# Patient Record
Sex: FEMALE | Race: WHITE | Employment: OTHER | URBAN - METROPOLITAN AREA
[De-identification: names, ages, dates, MRNs, and addresses within clinical notes are randomized per-mention and may not be internally consistent; named-entity substitution may affect disease eponyms.]

---

## 2017-06-21 ENCOUNTER — HOSPITAL ENCOUNTER (OUTPATIENT)
Dept: SLEEP MEDICINE | Age: 72
Discharge: HOME | End: 2017-06-21
Attending: INTERNAL MEDICINE
Payer: MEDICARE

## 2017-06-21 DIAGNOSIS — N18.5 CHRONIC KIDNEY DISEASE, STAGE V (HCC): ICD-10-CM

## 2017-06-21 DIAGNOSIS — R79.81 ABNORMAL ARTERIAL BLOOD GASES: ICD-10-CM

## 2017-06-21 DIAGNOSIS — G62.9 NEUROPATHY: ICD-10-CM

## 2017-06-21 DIAGNOSIS — R91.1 PULMONARY NODULE: ICD-10-CM

## 2017-06-21 DIAGNOSIS — R06.00 DYSPNEA: ICD-10-CM

## 2017-06-21 DIAGNOSIS — I50.22 CHRONIC SYSTOLIC HEART FAILURE (HCC): ICD-10-CM

## 2017-06-21 DIAGNOSIS — G47.33 OBSTRUCTIVE SLEEP APNEA (ADULT) (PEDIATRIC): ICD-10-CM

## 2017-06-21 DIAGNOSIS — I10 UNSPECIFIED ESSENTIAL HYPERTENSION: ICD-10-CM

## 2017-06-21 DIAGNOSIS — R06.3 CHEYNE-STOKES RESPIRATION: ICD-10-CM

## 2017-06-21 DIAGNOSIS — E66.2 OBESITY HYPOVENTILATION SYNDROME (HCC): ICD-10-CM

## 2017-06-21 DIAGNOSIS — E11.00 DM HYPEROSMOLARITY TYPE II (HCC): ICD-10-CM

## 2017-06-21 DIAGNOSIS — F03.90 DEMENTIA (HCC): ICD-10-CM

## 2017-06-21 DIAGNOSIS — G47.33 OSA (OBSTRUCTIVE SLEEP APNEA): ICD-10-CM

## 2017-06-21 PROCEDURE — 95810 POLYSOM 6/> YRS 4/> PARAM: CPT

## 2017-06-22 VITALS — HEART RATE: 80 BPM | SYSTOLIC BLOOD PRESSURE: 137 MMHG | DIASTOLIC BLOOD PRESSURE: 85 MMHG

## 2017-06-23 NOTE — PROCEDURES
[facility]  POLYSOMNOGRAM    Name:  Lc Sharpe  MR#:  [id number]  :  1945  Account #:  [de-identified]  Date of Adm:  2017  Date of Service:  2017      REFERRING PHYSICIAN:  Janneth Foster M.D. READING PHYSICIAN:  Nabeel Burroughs. Michael Hou M.D. HISTORY  The patient reports being on:  1. MSIR. 2. Neurontin. 3. Macrobid. 4. Prilosec. 5. Aricept. 6. Lasix. 7. Potassium chloride. 8. Senokot. 9. Lisinopril. 10. Miralax. 11. Multivitamin. 12. Calcium. 13. Cymbalta. 14. Lantus. 15. Zocor. 16. Humalog. Blood pressure prior to the study was 147/84 and post-study 137/85. The study was obtained due to suspected sleep apnea. She reported  an Selmer Sleepiness Score of 5. STOP-Bang score was 4 out of 8. Occasional PVCs were noted on the EKG monitoring. The patient  reported taking Restoril prior to the study. She reported 10 hours of  sleep the night prior to the study. She admitted to caffeine use. She  did not specify the amount. She denied naps and alcohol use. DESCRIPTION OF PROCEDURE AND FINDINGS:  The study was  obtained with the standard parameter monitoring. Total recording time  was 379 minutes and total sleep time was 376, for 100% sleep  efficiency. Sleep latency was 0.5 minutes. Wake time after sleep  onset was 5.5 minutes. Arousal index was normal at 5. On review of  sleep architecture, the patient entered all stages of sleep. REM  latency was 105. On respiratory review, the patient had 25 hypopneas,  for an overall apnea-hypopnea index of 4. In REM, she had an apnea-  hypopnea index of 8. Her overall RDI was 4.3. In REM, it was 9. Apnea-hypopnea index in supine sleep was 4. Snoring was minimally  noted supine. She did not sleep in other positions. Mean oxygen  saturation was 93% in non-REM and 92% in REM. Minimum was 80%  in non-REM and 77% in REM. She spent 51.5 minutes below 88% O2  saturation.   Mean heart rate was 73 in non-REM and 75 in REM. Minimum was 64 in non-REM and 68 in REM. Maximum was 80 in  non-REM and 81 in REM. She had a periodic movements of sleep  index of 0.50, associated with arousal.    CONCLUSION:  The patient's polysomnographic study was remarkable  for mild primarily REM obstructive sleep apnea, but with significant  oxygen desaturation down to 77%. Would consider the institution of  continuous positive airway pressure therapy as clinically warranted. The patient should not drive if drowsy.         Maureen Fiore MD    NM / [address]  D:  06/22/2017   14:30  T:  06/22/2017   19:46  Job #:  [id number]

## 2019-11-08 PROBLEM — R78.81 BACTEREMIA DUE TO GRAM-NEGATIVE BACTERIA: Status: ACTIVE | Noted: 2019-10-31

## 2019-11-08 PROBLEM — I27.20 PULMONARY HYPERTENSION (HCC): Status: ACTIVE | Noted: 2017-05-15

## 2019-11-08 PROBLEM — R60.0 EDEMA, LOWER EXTREMITY: Status: ACTIVE | Noted: 2018-09-28

## 2019-11-08 PROBLEM — R74.8 ALKALINE PHOSPHATASE ELEVATION: Status: ACTIVE | Noted: 2019-10-31

## 2019-11-08 PROBLEM — N39.0 UTI DUE TO EXTENDED-SPECTRUM BETA LACTAMASE (ESBL) PRODUCING ESCHERICHIA COLI: Status: ACTIVE | Noted: 2018-12-26

## 2019-11-08 PROBLEM — H35.342 MACULAR CYST, HOLE, OR PSEUDOHOLE, LEFT EYE: Status: ACTIVE | Noted: 2019-11-08

## 2019-11-08 PROBLEM — E87.5 HYPERKALEMIA: Status: ACTIVE | Noted: 2017-05-12

## 2019-11-08 PROBLEM — I10 HYPERTENSION: Status: ACTIVE | Noted: 2017-05-12

## 2019-11-08 PROBLEM — B96.29 UTI DUE TO EXTENDED-SPECTRUM BETA LACTAMASE (ESBL) PRODUCING ESCHERICHIA COLI: Status: ACTIVE | Noted: 2018-12-26

## 2019-11-08 PROBLEM — H43.811 VITREOUS DEGENERATION OF RIGHT EYE: Status: ACTIVE | Noted: 2019-11-08

## 2019-11-08 PROBLEM — G93.41 ACUTE METABOLIC ENCEPHALOPATHY: Status: ACTIVE | Noted: 2018-12-26

## 2019-11-08 PROBLEM — Z98.84 HX OF GASTRIC BYPASS: Status: ACTIVE | Noted: 2017-05-12

## 2019-11-08 PROBLEM — N18.9 CHRONIC KIDNEY DISEASE: Status: ACTIVE | Noted: 2017-05-11

## 2019-11-08 PROBLEM — N30.00 ACUTE CYSTITIS WITHOUT HEMATURIA: Status: ACTIVE | Noted: 2019-09-15

## 2019-11-08 PROBLEM — I73.9 PERIPHERAL VASCULAR DISEASE (HCC): Status: ACTIVE | Noted: 2018-09-28

## 2019-11-08 PROBLEM — G47.33 OBSTRUCTIVE SLEEP APNEA: Status: ACTIVE | Noted: 2017-05-15

## 2019-11-08 PROBLEM — N39.0 URINARY TRACT INFECTION, SITE NOT SPECIFIED: Status: ACTIVE | Noted: 2019-09-15

## 2019-11-08 PROBLEM — K83.8 DILATED CBD, ACQUIRED: Status: ACTIVE | Noted: 2019-10-31

## 2019-11-08 PROBLEM — H33.322: Status: ACTIVE | Noted: 2019-11-08

## 2019-11-08 PROBLEM — S82.102A CLOSED FRACTURE OF LEFT PROXIMAL TIBIA: Status: ACTIVE | Noted: 2019-04-05

## 2019-11-08 PROBLEM — E11.9 CONTROLLED TYPE 2 DIABETES MELLITUS WITHOUT COMPLICATION (HCC): Status: ACTIVE | Noted: 2018-09-28

## 2019-11-08 PROBLEM — Z16.12 UTI DUE TO EXTENDED-SPECTRUM BETA LACTAMASE (ESBL) PRODUCING ESCHERICHIA COLI: Status: ACTIVE | Noted: 2018-12-26

## 2019-11-08 PROBLEM — H35.372 PUCKERING OF MACULA, LEFT EYE: Status: ACTIVE | Noted: 2019-11-08

## 2019-11-08 PROBLEM — L97.422 SKIN ULCER OF LEFT HEEL WITH FAT LAYER EXPOSED (HCC): Status: ACTIVE | Noted: 2018-09-28

## 2019-11-08 PROBLEM — R41.0 DELIRIUM: Status: ACTIVE | Noted: 2019-11-08

## 2019-11-08 PROBLEM — E11.3291 TYPE 2 DIABETES MELLITUS WITH MILD NONPROLIFERATIVE DIABETIC RETINOPATHY WITHOUT MACULAR EDEMA, RIGHT EYE (HCC): Status: ACTIVE | Noted: 2019-11-08
